# Patient Record
Sex: FEMALE | Race: WHITE
[De-identification: names, ages, dates, MRNs, and addresses within clinical notes are randomized per-mention and may not be internally consistent; named-entity substitution may affect disease eponyms.]

---

## 2017-09-20 ENCOUNTER — HOSPITAL ENCOUNTER (OUTPATIENT)
Dept: HOSPITAL 80 - FIMAGING | Age: 72
End: 2017-09-20
Attending: PHYSICIAN ASSISTANT
Payer: COMMERCIAL

## 2017-09-20 DIAGNOSIS — N28.1: ICD-10-CM

## 2017-09-20 DIAGNOSIS — N20.0: Primary | ICD-10-CM

## 2018-05-11 ENCOUNTER — HOSPITAL ENCOUNTER (OUTPATIENT)
Dept: HOSPITAL 80 - FIMAGING | Age: 73
End: 2018-05-11
Attending: FAMILY MEDICINE
Payer: COMMERCIAL

## 2018-05-11 DIAGNOSIS — Z12.31: Primary | ICD-10-CM

## 2018-05-25 ENCOUNTER — HOSPITAL ENCOUNTER (EMERGENCY)
Dept: HOSPITAL 80 - FED | Age: 73
Discharge: HOME | End: 2018-05-25
Payer: COMMERCIAL

## 2018-05-25 VITALS — DIASTOLIC BLOOD PRESSURE: 85 MMHG | SYSTOLIC BLOOD PRESSURE: 126 MMHG

## 2018-05-25 DIAGNOSIS — Z95.5: ICD-10-CM

## 2018-05-25 DIAGNOSIS — I25.2: ICD-10-CM

## 2018-05-25 DIAGNOSIS — R19.7: Primary | ICD-10-CM

## 2018-05-25 LAB — PLATELET # BLD: 257 10^3/UL (ref 150–400)

## 2018-05-25 NOTE — EDPHY
HPI/HX/ROS/PE/MDM


Narrative: 


CHIEF COMPLAINT: Nausea, diarrhea, indigestion, decreased appetite





HPI: 


The patient is a 74 y/o female with a history of an MI and multiple cardiac 

stents complaining of nausea, diarrhea, indigestion, and a decreased appetite 

for several weeks. She also has left back and rib pain. When she burps her pain 

goes away and her symptoms are alleviated. Denies chest pain, shortness of 

breath, numbness, paresthesias, fever, vomiting, urinary complaints. 





She was seen at [facility] when she had her MI and stents placed.





REVIEW OF SYSTEMS:


Aside from elements discussed in the HPI, a comprehensive 10-point review of 

systems was reviewed and is negative.





PMH: MI, cardiac stents





SOCIAL HISTORY: Lives in [place], retired, single





PHYSICAL EXAM:


General: Patient is alert, in no acute distress.


ENT: Eyes are normal to inspection.  ENT inspection normal.


Neck: Normal inspection.  Full range of motion.


Respiratory: No respiratory distress.  Breath sounds normal bilaterally.


Cardiovascular: Regular rate and rhythm.  Strong peripheral pulses.  Normal cap 

refill.


Abdomen: The abdomen is nontender to palpation. There are no peritoneal signs. 

There are normal bowel sounds.


Back: Normal to inspection.  No tenderness to palpation.


Skin: Normal color.  No rash.  Warm and dry.


Extremities: Normal appearance.  Full range of motion.


Neuro: Oriented x3.  Normal motor function.  Normal sensory function.





ED Course: 


1124: EKG was ordered and interpreted by myself.  Please see Figure 8 Surgical system 

for official reading. This EKG is unchanged from EKG at [facility] on 05/30/15.





1139: Patient's chest x-ray is normal.





1359: Repeat troponin and EKG ordered.





EKG was ordered and interpreted by myself and is unchanged from earlier EKG.  

Please see Figure 8 Surgical system for official reading.





1400: Reassessed patient and discussed imaging and laboratory findings. She 

denies any chest pain but states she had some diarrhea earlier. She is unable 

to provide a stool sample. 4mg PO Imodium given.





1439: Reassessed patient and discussed outpatient follow up with Dr. Gonzales, 

gastroenterologist. Return precautions provided; she is comfortable with this 

plan.





MDM: 





This patient presents with several weeks of diarrhea, associated with some 

nausea and upper chest pain which is relieved with burping.  All these symptoms 

suggest a GI etiology, but given history of MI, we performed an extensive 

workup including multiple ECG, troponins and other labs which are thankfully 

normal.  The patient has no active chest pain here. I see no evidence for ACS.  

Despite her complaint of diarrhea, she has been unable to provide a stool 

sample here.  She has no risk factors for C.diff.  She is comfortable with the 

plan to go home for now - I will refer her to GI for further workup.





- Data Points


Imaging Results: 


 Imaging Impressions





Chest X-Ray  05/25/18 11:39


Impression:


1. No active cardiopulmonary disease seen.











Imaging: I viewed and interpreted images myself


Laboratory Results: 


 Laboratory Results





 05/25/18 11:25 





 05/25/18 11:25 





 











  05/25/18 05/25/18 05/25/18





  14:22 11:25 11:25


 


WBC      9.40 10^3/uL 10^3/uL





     (3.80-9.50) 


 


RBC      4.32 10^6/uL 10^6/uL





     (4.18-5.33) 


 


Hgb      14.5 g/dL g/dL





     (12.6-16.3) 


 


Hct      41.3 % %





     (38.0-47.0) 


 


MCV      95.6 fL fL





     (81.5-99.8) 


 


MCH      33.6 pg pg





     (27.9-34.1) 


 


MCHC      35.1 g/dL g/dL





     (32.4-36.7) 


 


RDW      13.2 % %





     (11.5-15.2) 


 


Plt Count      257 10^3/uL 10^3/uL





     (150-400) 


 


MPV      10.5 fL fL





     (8.7-11.7) 


 


Neut % (Auto)      69.4 % %





     (39.3-74.2) 


 


Lymph % (Auto)      21.6 % %





     (15.0-45.0) 


 


Mono % (Auto)      7.1 % %





     (4.5-13.0) 


 


Eos % (Auto)      1.0 % %





     (0.6-7.6) 


 


Baso % (Auto)      0.5 % %





     (0.3-1.7) 


 


Nucleat RBC Rel Count      0.0 % %





     (0.0-0.2) 


 


Absolute Neuts (auto)      6.52 10^3/uL H 10^3/uL





     (1.70-6.50) 


 


Absolute Lymphs (auto)      2.03 10^3/uL 10^3/uL





     (1.00-3.00) 


 


Absolute Monos (auto)      0.67 10^3/uL 10^3/uL





     (0.30-0.80) 


 


Absolute Eos (auto)      0.09 10^3/uL 10^3/uL





     (0.03-0.40) 


 


Absolute Basos (auto)      0.05 10^3/uL 10^3/uL





     (0.02-0.10) 


 


Absolute Nucleated RBC      0.00 10^3/uL 10^3/uL





     (0-0.01) 


 


Immature Gran %      0.4 % %





     (0.0-1.1) 


 


Immature Gran #      0.04 10^3/uL 10^3/uL





     (0.00-0.10) 


 


Sodium    142 mEq/L mEq/L  





    (135-145)  


 


Potassium    4.4 mEq/L mEq/L  





    (3.3-5.0)  


 


Chloride    112 mEq/L H mEq/L  





    ()  


 


Carbon Dioxide    17 mEq/l L mEq/l  





    (22-31)  


 


Anion Gap    13 mEq/L mEq/L  





    (8-16)  


 


BUN    19 mg/dL mg/dL  





    (7-23)  


 


Creatinine    1.0 mg/dL mg/dL  





    (0.6-1.0)  


 


Estimated GFR    55   





    


 


Glucose    97 mg/dL mg/dL  





    ()  


 


Calcium    10.0 mg/dL mg/dL  





    (8.5-10.4)  


 


Troponin I  < 0.012 ng/mL ng/mL  < 0.012 ng/mL ng/mL  





   (0.000-0.034)   (0.000-0.034)  











Medications Given: 


 








Discontinued Medications





Loperamide HCl ( Imodium)  4 mg PO EDNOW ONE


   Stop: 05/25/18 14:05


   Last Admin: 05/25/18 14:54 Dose:  4 mg








General


Time Seen by Provider: 05/25/18 11:29


Initial Vital Signs: 


 Initial Vital Signs











Temperature (C)  36.7 C   05/25/18 10:32


 


Heart Rate  80   05/25/18 10:32


 


Respiratory Rate  18   05/25/18 10:32


 


Blood Pressure  160/97 H  05/25/18 10:32


 


O2 Sat (%)  97   05/25/18 10:32








 











O2 Delivery Mode               Room Air














Allergies/Adverse Reactions: 


 





amlodipine Allergy (Verified 05/25/18 10:29)


 


bupropion [From Contrave] Allergy (Verified 05/25/18 10:29)


 


clindamycin Allergy (Verified 05/25/18 10:29)


 


fenofibrate Allergy (Verified 05/25/18 10:28)


 


formaldehyde Allergy (Verified 05/25/18 10:28)


 


naltrexone [From Contrave] Allergy (Verified 05/25/18 10:29)


 


iv contrast Allergy (Uncoded 05/25/18 10:29)


 


unknown Allergy (Uncoded 05/25/18 10:28)


 








Home Medications: 














 Medication  Instructions  Recorded


 


Brilinta  05/25/18


 


Bumetanide  05/25/18


 


Carvedilol  05/25/18


 


Levothyroxine  05/25/18


 


Lisinopril  05/25/18


 


Ondansetron HCl [Zofran] 4 mg PO Q6 PRN #10 tablet 05/25/18


 


SIMVASTATIN  05/25/18


 


Spironolactone  05/25/18














Departure





- Departure


Disposition: Home, Routine, Self-Care


Clinical Impression: 


Diarrhea


Qualifiers:


 Diarrhea type: unspecified type Qualified Code(s): R19.7 - Diarrhea, 

unspecified





Condition: Good


Instructions:  Acute Diarrhea (ED)


Additional Instructions: 


Take Zofran as prescribed for nausea.





Follow-up with a gastroenterologist in the next week.





Follow-up with your primary doctor within 72 hours for unimproved symptoms if 

you are unable to see a gastroenterologist.  





Return to the Emergency Department for fever, worsening pain, urinary complaints

, inability to eat or failure to improve within 72 hours.





Referrals: 


Polina Gann MD [Primary Care Provider] - As per Instructions


Justin Gonzales MD [Medical Doctor] - As per Instructions


Prescriptions: 


Ondansetron HCl [Zofran] 4 mg PO Q6 PRN #10 tablet


 PRN Reason: Nausea/Vomiting, Use 1st


Report Scribed for: Tylor Good


Report Scribed by: Cindi Charlton


Date of Report: 05/25/18


Time of Report: 11:34


Physician Review and Approval Statement: Portions of this note were transcribed 

by an ED scribe.  I personally performed the history, physical exam, and 

medical decision making; and confirm the accuracy of the information in the 

transcribed note.

## 2018-05-25 NOTE — CPEKG
Heart Rate: 63

RR Interval: 952

P-R Interval: 136

QRSD Interval: 104

QT Interval: 448

QTC Interval: 459

P Axis: 21

QRS Axis: -5

T Wave Axis: 265

EKG Severity - ABNORMAL ECG -

EKG Impression: SINUS RHYTHM

EKG Impression: INFERIOR INFARCT, AGE INDETERMINATE

EKG Impression: BORDERLINE R WAVE PROGRESSION, ANTERIOR LEADS

EKG Impression: NONSPECIFIC T ABNORMALITIES, ANT-LAT LEADS

Electronically Signed By: Reji Kan 30-May-2018 11:46:10

## 2018-05-25 NOTE — CPEKG
Heart Rate: 68

RR Interval: 882

P-R Interval: 148

QRSD Interval: 108

QT Interval: 436

QTC Interval: 464

P Axis: 18

QRS Axis: 3

T Wave Axis: -89

EKG Severity - ABNORMAL ECG -

EKG Impression: SINUS RHYTHM

EKG Impression: PROBABLE INFERIOR INFARCT, AGE INDETERMINATE

EKG Impression: ABNORMAL T, CONSIDER ISCHEMIA, ANT-LAT LEADS

Electronically Signed By: Tylor Good 25-May-2018 11:50:39

## 2018-11-16 ENCOUNTER — HOSPITAL ENCOUNTER (INPATIENT)
Dept: HOSPITAL 80 - FED | Age: 73
LOS: 5 days | Discharge: HOME | DRG: 227 | End: 2018-11-21
Attending: FAMILY MEDICINE | Admitting: INTERNAL MEDICINE
Payer: COMMERCIAL

## 2018-11-16 DIAGNOSIS — Z87.891: ICD-10-CM

## 2018-11-16 DIAGNOSIS — N17.9: ICD-10-CM

## 2018-11-16 DIAGNOSIS — E03.9: ICD-10-CM

## 2018-11-16 DIAGNOSIS — Z79.82: ICD-10-CM

## 2018-11-16 DIAGNOSIS — I25.2: ICD-10-CM

## 2018-11-16 DIAGNOSIS — Z95.5: ICD-10-CM

## 2018-11-16 DIAGNOSIS — E78.5: ICD-10-CM

## 2018-11-16 DIAGNOSIS — E86.0: ICD-10-CM

## 2018-11-16 DIAGNOSIS — I25.10: ICD-10-CM

## 2018-11-16 DIAGNOSIS — R19.7: ICD-10-CM

## 2018-11-16 DIAGNOSIS — I48.0: Primary | ICD-10-CM

## 2018-11-16 DIAGNOSIS — I10: ICD-10-CM

## 2018-11-16 DIAGNOSIS — I25.5: ICD-10-CM

## 2018-11-16 DIAGNOSIS — I73.9: ICD-10-CM

## 2018-11-16 DIAGNOSIS — K21.9: ICD-10-CM

## 2018-11-16 LAB
INR PPP: 0.97 (ref 0.83–1.16)
PLATELET # BLD: 345 10^3/UL (ref 150–400)
PROTHROMBIN TIME: 13.1 SEC (ref 12–15)

## 2018-11-16 PROCEDURE — C1721 AICD, DUAL CHAMBER: HCPCS

## 2018-11-16 PROCEDURE — C1898 LEAD, PMKR, OTHER THAN TRANS: HCPCS

## 2018-11-16 PROCEDURE — C1777 LEAD, AICD, ENDO SINGLE COIL: HCPCS

## 2018-11-16 RX ADMIN — TICAGRELOR SCH MG: 90 TABLET ORAL at 21:14

## 2018-11-16 RX ADMIN — ATORVASTATIN CALCIUM SCH: 20 TABLET, FILM COATED ORAL at 21:29

## 2018-11-16 RX ADMIN — PANTOPRAZOLE SODIUM SCH MG: 40 INJECTION, POWDER, FOR SOLUTION INTRAVENOUS at 13:36

## 2018-11-16 RX ADMIN — ENOXAPARIN SODIUM SCH MG: 100 INJECTION SUBCUTANEOUS at 21:14

## 2018-11-16 NOTE — CPEKG
Test Reason : OPEN

Blood Pressure : ***/*** mmHG

Vent. Rate : 149 BPM     Atrial Rate : 333 BPM

   P-R Int : 392 ms          QRS Dur : 114 ms

    QT Int : 305 ms       P-R-T Axes : 000 001 252 degrees

   QTc Int : 480 ms

 

Atrial fibrillation with rapid V-rate

Inferior infarct, recent

Lateral leads are also involved

 

Confirmed by Martin Awan ([id number]) on 11/16/2018 3:30:27 PM

 

Referred By:             Confirmed By:Martin Awan

## 2018-11-16 NOTE — EDPHY
H & P


Time Seen by Provider: 11/16/18 09:46


HPI/ROS: 





Chief complaint.  Nausea, vomiting, diarrhea





HPI.  73-year-old female presents with complaint of vomiting and diarrhea for 1 

month.  No fever.  Some indigestion mid abdomen but otherwise no abdominal 

pain.  Occasional chest discomfort.  She feels palpitations.  Previous atrial 

fibrillation and previous MI.  Decreased oral intake secondary to nausea and 

vomiting.  





ROS


10 systems were reviewed and negative with the exception of the elements 

mentioned in the history of present illness





Past Medical/Surgical History: 





Previous MI and hypertension


Social History: 





Single, nonsmoker, no alcohol.  Lives alone


Smoking Status: Never smoked


Physical Exam: 





General Appearance:  Alert well-developed female moderate distress vital signs 

significant for heart rate 112


Eyes: Pupils equal and round no pallor or injection.


ENT, Mouth:  Mucous membranes are moist.


Respiratory:  There are no retractions, lungs are clear to auscultation.


Cardiovascular:  Irregularly irregular rate and rhythm


Gastrointestinal:   Abdomen is soft and nontender, no masses, bowel sounds 

normal.


Neurological:  Awake and alert, sensory and motor exams grossly normal.


Skin: Warm and dry, no rashes.


Musculoskeletal:  Neck is supple nontender.


Extremities  symmetrical, full range of motion.


Psychiatric: Patient is oriented X 3, there is no agitation.


Constitutional: 


 Initial Vital Signs











Temperature (C)  36.7 C   11/16/18 09:16


 


Heart Rate  112 H  11/16/18 09:16


 


Respiratory Rate  17   11/16/18 09:16


 


Blood Pressure  132/84 H  11/16/18 09:16


 


O2 Sat (%)  96   11/16/18 09:16








 











O2 Delivery Mode               Room Air














Allergies/Adverse Reactions: 


 





amlodipine Allergy (Verified 11/16/18 11:36)


 Vomiting


clindamycin Allergy (Verified 11/16/18 11:36)


 Rash


fenofibrate Allergy (Verified 11/16/18 11:36)


 Vomiting


formaldehyde Allergy (Verified 11/16/18 11:36)


 Vomiting


iv contrast Allergy (Uncoded 05/25/18 10:29)


 








Home Medications: 














 Medication  Instructions  Recorded


 


Carvedilol [Coreg (*)] 6.25 mg PO BID 05/25/18


 


Simvastatin [Zocor] 40 mg PO HS 05/25/18


 


Ticagrelor [Brilinta] 90 mg PO BID 05/25/18


 


Aspirin [Aspirin 81mg (*)] 81 mg PO DAILY 11/16/18


 


Levothyroxine [Synthroid 88 mcg 88 mcg PO DAILY06 11/16/18





(*)]  


 


Spironolactone [Aldactone 25 MG 25 mg PO BID 11/16/18





(*)]  














Medical Decision Making





- Diagnostics


EKG Interpretation: 





EKG interpreted by me shows atrial fibrillation with rapid ventricular rate.  

Left axis deviation.  QRS shows no significant ST elevation or depression.  

Ventricular response is 149


Imaging Results: 


 Imaging Impressions





Chest X-Ray  11/16/18 09:47


Impression: Query low-grade congestive heart failure without pulmonary edema.








Chest x-ray interpreted by me shows pneumonia.  Possible CHF


Procedures: 





IV normal saline, monitor





After reviewing patient's allergies and the possible cross reaction of 

amlodipine to diltiazem.  Patient tells me that she has not had amlodipine for 

a long time but it made her sick.  It does not sound like there was an allergic 

reaction





IV diltiazem bolus and drip


ED Course/Re-evaluation: 





Re-evaluation at 10:45 a.m. A.m..  Heart rate 102. Still in AFib.  Diltiazem 

drip running at 5 milligrams/hour.  Blood pressure 129/74





Patient and I discussed imaging lab results.  We discussed treatment plan 

including recommendation for admission.  She expresses understanding and 

agreement





I consulted discussed case Dr. Hilton molina who agrees to the admission


Differential Diagnosis: 





I considered electrolyte and dehydration as potential cause of atrial 

fibrillation.  I have considered acute coronary syndrome.  Patient has atrial 

fibrillation with RVR





- Data Points


Laboratory Results: 


 Laboratory Results





 11/16/18 09:40 





 11/16/18 09:40 





 











  11/16/18 11/16/18 11/16/18





  09:47 09:40 09:40


 


WBC      





    


 


RBC      





    


 


Hgb      





    


 


Hct      





    


 


MCV      





    


 


MCH      





    


 


MCHC      





    


 


RDW      





    


 


Plt Count      





    


 


MPV      





    


 


Neut % (Auto)      





    


 


Lymph % (Auto)      





    


 


Mono % (Auto)      





    


 


Eos % (Auto)      





    


 


Baso % (Auto)      





    


 


Nucleat RBC Rel Count      





    


 


Absolute Neuts (auto)      





    


 


Absolute Lymphs (auto)      





    


 


Absolute Monos (auto)      





    


 


Absolute Eos (auto)      





    


 


Absolute Basos (auto)      





    


 


Absolute Nucleated RBC      





    


 


Immature Gran %      





    


 


Immature Gran #      





    


 


PT      





    


 


INR      





    


 


APTT      





    


 


Sodium      134 mEq/L L mEq/L





     (135-145) 


 


Potassium      4.4 mEq/L mEq/L





     (3.3-5.0) 


 


Chloride      105 mEq/L mEq/L





     () 


 


Carbon Dioxide      15 mEq/l L mEq/l





     (22-31) 


 


Anion Gap      14 mEq/L mEq/L





     (6-14) 


 


BUN      53 mg/dL H mg/dL





     (7-23) 


 


Creatinine      1.4 mg/dL H mg/dL





     (0.6-1.0) 


 


Estimated GFR      37 





    


 


Glucose      128 mg/dL H mg/dL





     () 


 


Calcium      10.0 mg/dL mg/dL





     (8.5-10.4) 


 


Magnesium    Pending   





    


 


POC Troponin I  0.00 ng/mL ng/mL    





   (0.00-0.08)   


 


Troponin I    < 0.012 ng/mL ng/mL  





    (0.000-0.034)  


 


TSH    6.680 uIU/mL H uIU/mL  





    (0.465-4.680)  














  11/16/18 11/16/18





  09:40 09:40


 


WBC    13.94 10^3/uL H 10^3/uL





    (3.80-9.50) 


 


RBC    4.78 10^6/uL 10^6/uL





    (4.18-5.33) 


 


Hgb    15.6 g/dL g/dL





    (12.6-16.3) 


 


Hct    43.4 % %





    (38.0-47.0) 


 


MCV    90.8 fL fL





    (81.5-99.8) 


 


MCH    32.6 pg pg





    (27.9-34.1) 


 


MCHC    35.9 g/dL g/dL





    (32.4-36.7) 


 


RDW    13.0 % %





    (11.5-15.2) 


 


Plt Count    345 10^3/uL 10^3/uL





    (150-400) 


 


MPV    10.6 fL fL





    (8.7-11.7) 


 


Neut % (Auto)    71.6 % %





    (39.3-74.2) 


 


Lymph % (Auto)    20.4 % %





    (15.0-45.0) 


 


Mono % (Auto)    7.2 % %





    (4.5-13.0) 


 


Eos % (Auto)    0.2 % L %





    (0.6-7.6) 


 


Baso % (Auto)    0.2 % L %





    (0.3-1.7) 


 


Nucleat RBC Rel Count    0.0 % %





    (0.0-0.2) 


 


Absolute Neuts (auto)    9.99 10^3/uL H 10^3/uL





    (1.70-6.50) 


 


Absolute Lymphs (auto)    2.84 10^3/uL 10^3/uL





    (1.00-3.00) 


 


Absolute Monos (auto)    1.00 10^3/uL H 10^3/uL





    (0.30-0.80) 


 


Absolute Eos (auto)    0.03 10^3/uL 10^3/uL





    (0.03-0.40) 


 


Absolute Basos (auto)    0.03 10^3/uL 10^3/uL





    (0.02-0.10) 


 


Absolute Nucleated RBC    0.00 10^3/uL 10^3/uL





    (0-0.01) 


 


Immature Gran %    0.4 % %





    (0.0-1.1) 


 


Immature Gran #    0.05 10^3/uL 10^3/uL





    (0.00-0.10) 


 


PT  13.1 SEC SEC  





   (12.0-15.0)  


 


INR  0.97   





   (0.83-1.16)  


 


APTT  29.6 SEC SEC  





   (23.0-38.0)  


 


Sodium    





   


 


Potassium    





   


 


Chloride    





   


 


Carbon Dioxide    





   


 


Anion Gap    





   


 


BUN    





   


 


Creatinine    





   


 


Estimated GFR    





   


 


Glucose    





   


 


Calcium    





   


 


Magnesium    





   


 


POC Troponin I    





   


 


Troponin I    





   


 


TSH    





   











Medications Given: 


 





Ondansetron HCl (Zofran)  4 mg IVP Q4HRS PRN


   PRN Reason: Nausea/Vomiting, Can't Take PO


   Stop: 05/15/19 12:26


   Last Admin: 11/16/18 14:45 Dose:  4 mg


Pantoprazole Sodium (Protonix)  40 mg IVP DAILY GILA


   Stop: 05/15/19 13:29


   Last Admin: 11/16/18 13:36 Dose:  40 mg





Discontinued Medications





Diltiazem HCl (Cardizem 25 Mg/5 Ml Vial)  10 mg IVP EDNOW ONE


   Stop: 11/16/18 09:58


   Last Admin: 11/16/18 10:13 Dose:  10 mg


Enoxaparin Sodium (Lovenox)  50 mg SC EDNOW ONE


   Stop: 11/16/18 10:52


   Last Admin: 11/16/18 11:40 Dose:  50 mg


Sodium Chloride (Ns)  500 mls @ 0 mls/hr IV EDNOW ONE; Wide Open


   PRN Reason: Protocol


   Stop: 11/16/18 09:47


   Last Admin: 11/16/18 09:49 Dose:  500 mls


Diltiazem HCl 125 mg/ Dextrose  125 mls @ 0 mls/hr IV EDNOW ONE; As Directed


   PRN Reason: Protocol


   Stop: 11/16/18 09:58


   Last Admin: 11/16/18 10:26 Dose:  125 mls


Ondansetron HCl (Zofran)  4 mg IVP EDNOW ONE


   Stop: 11/16/18 09:52


   Last Admin: 11/16/18 09:53 Dose:  4 mg





Point of Care Test Results: 


 Chemistry











  11/16/18





  09:47


 


POC Troponin I  0.00 ng/mL ng/mL





   (0.00-0.08) 














Departure





- Departure


Disposition: [facility] Inpatient Acute


Clinical Impression: 


Atrial fibrillation


Qualifiers:


 Atrial fibrillation type: unspecified Qualified Code(s): I48.91 - Unspecified 

atrial fibrillation





Condition: Fair

## 2018-11-16 NOTE — PDMN
Medical Necessity


Medical necessity: Pt meets inpt criteria per MD order and MCG M-505, Atrial 

Fibrillation. 74 y/o w/hx off CAD/inferior MI s/p multivessel PCI complicated 

by severe ischemic cardiomyopathy (LVEF 18% on recent ECHO) admitted with Afib w

/RVR (HR in 140's upon admission), dehydration, HERB, and dyspepsia. Diltiazem 

gtt, IV PPI, cardiology consult pending. Other comorbidities include paroxysmal 

afib not on anticoagulation, PVD s/p revascularizations. Anticipate>2MN for 

ongoing eval/management of above issues in pt w/complex cardiovascular hx.

## 2018-11-16 NOTE — GHP
DATE OF ADMISSION:  11/16/2018



CHIEF COMPLAINT:  Nausea, vomiting, and feeling unwell.



HISTORY OF PRESENT ILLNESS:  This 73-year-old female has a history of complex cardiac nature of infer
ior myocardial infarction with multivessel PCI with subsequent ischemic cardiomyopathies performed at
 an outside hospital.  She had subsequently transferred care to Dr. Gann at our facility a few months
 ago.  Her last echocardiogram a few months ago showed an LVEF of 18%, which apparently is stable.  S
he has a history of paroxysmal atrial fibrillation as well.  She has refused both defibrillator place
ment and anticoagulation in the past.  She has severe peripheral vascular disease with endografts in 
her aorta, her renal arteries, her iliac arteries.  She has a history of distally occluded aorta from
 Leriche's syndrome as well.  She apparently was doing well until a few months ago when she had losar
tan given to her.  This caused significant nausea, vomiting.  She actually stopped that drug 4 months
 ago and was feeling well for a while, had been placed on lisinopril but recently has been noticing o
nce again indigestion with GERD and diarrhea.  She feels this is similar to the diarrhea she initiall
y had with losartan.  Now she has not been taking that drug.  Today she felt her heart was racing wit
hout syncope.  She came to the emergency room where she was noted to be in atrial fibrillation with r
apid ventricular response, which was well controlled with IV diltiazem.  She is receiving IV hydratio
n as well.  She has some prerenal azotemia from her labs.  She has not had any angina or other issues
.  She denies any fever, chills, nausea, vomiting, GI/ blood loss.  She is seen resting in her bed.
  She seems to be fairly comfortable at this time.



ALLERGIES:  She has allergies to amlodipine, clonidine, fenofibrate, formaldehyde, and IV contrast.



OUTPATIENT MEDICATIONS:  Include carvedilol, simvastatin, Brilinta, aspirin, levothyroxine, spironola
ctone, and she was taking lisinopril up until recently.



PAST MEDICAL HISTORY:  Once again, coronary artery disease with ischemic cardiomyopathy, paroxysmal a
trial fibrillation, peripheral vascular disease, hypertension.



SURGICAL HISTORY:  See HPI.



SOCIAL HISTORY:  She has a history of former smoker.  She does not smoke now.  She has no alcohol or 
drug abuse.



REVIEW OF SYSTEMS:  10-point system is negative except for the HPI.



PHYSICAL EXAMINATION:  VITAL SIGNS:  Blood pressure is 124/75, heart rate in the 90s, atrial fibrilla
tion.  GENERAL:  She is a thin female who is alert and oriented x3.  HEENT:  Mouth, oropharynx appear
 to be dry.  LUNGS:  Clear to auscultation.  CARDIOVASCULAR:  She had a S1, S2 with a soft systolic m
urmur.  ABDOMEN:  Soft, nontender.  She had no edema.  She had decreased pedal pulses.  She had a rad
ial pulse on the left side which was stronger __________ right side.



LABS:  Potassium of 4.4, BUN of 54, creatinine 1.4, troponin 6.6, troponin, it was normal.  BNP is pe
nding.  White count of 13, hemoglobin of 15.



ASSESSMENT:  

1.  Atrial fibrillation, unknown chronicity.  Patient has a history of paroxysmal atrial fibrillation
.  Only EKG we have in our office was from 09/17 that showed normal sinus rhythm with inferior myocar
dial infarction and premature atrial contractions.  Patient denies any syncope or other issues.  This
 atrial fibrillation is of unknown chronicity.  At this point, she once again does not want anticoagu
lation.  She has agreed to Lovenox while she is in the hospital.  Her rate is under good control with
 intravenous diltiazem and intravenous hydration.  We will continue these current medications for now
.  We will switch over to by mouth medications if needed once her gastrointestinal status is under be
tter control as she still is complaining of significant nausea and diarrhea.  

2.  Nausea and diarrhea, unclear etiology.  Apparently chronic.  Some related to losartan, but this i
s a medication that was given many months ago.  She is not having any other fevers or other issues at
 this time.  This will be worked up by the hospitalist service.  

3.  History of peripheral vascular disease.  Patient on aspirin, Brilinta.  

4.  Patient on a statin therapy as well.  For now, continue current medical management.  We will foll
ow and further decisions depending on her clinical course.  At this time, she does not want automatic
 implanted cardioverter defibrillator placement or anticoagulation long-term.





Job #:  [id number]

## 2018-11-16 NOTE — ASMTCMCOM
CM Note

 

CM Note                       

Notes:

Pt has been admitted with n/v/d x 1 month. She has a hx of MI, afib, CAD. Cardiology following. CM 

will follow for any d/c needs.

 

Date Signed:  11/16/2018 04:33 PM

Electronically Signed By:DOMINGUEZ Wang

## 2018-11-16 NOTE — PDGENHP
History and Physical





- Chief Complaint


"I feel awful"





- History of Present Illness


72yo F with history of CAD/inferior MI s/p multivessel PCI complicated by 

severe ischemic cardiomyopathy (LVEF 18% on recent echo), paroxysmal atrial 

fibrillation not on anticoagulation, peripheral vascular disease s/p several 

revascularizations presents with numerous complaints. She reports about 6 

months of diarrhea in addition to recently having quite a bit of indigestion 

associated with non-bloody emesis. She initially attributed her diarrhea to 

losartan, which was started just before her diarrhea began. She self-

discontinued losartan about 1 month ago and her diarrhea abated somewhat until 

about 10 days ago. She reports at that time that her synthroid dose had been 

increased from 88 to 100mcg daily and she attributes her more recent diarrhea 

to this dose increase. She states that she feels terrible. Denies fevers, chills

, recent travel, history of C diff colitis, sick contacts. 





In the ED, she was noted to be in atrial fibrillation with ventricular rates in 

the 140s. She was started on a diltiazem drip with good control. She denies 

chest pain, palpitations, shortness of breath, presyncope/syncope, leg swelling

, abdominal distention, or orthopnea. 





History Information





- Allergies/Home Medication List


Allergies/Adverse Reactions: 








amlodipine Allergy (Verified 11/16/18 11:36)


 Vomiting


clindamycin Allergy (Verified 11/16/18 11:36)


 Rash


fenofibrate Allergy (Verified 11/16/18 11:36)


 Vomiting


formaldehyde Allergy (Verified 11/16/18 11:36)


 Vomiting


iv contrast Allergy (Uncoded 05/25/18 10:29)


 





Home Medications: 








Carvedilol [Coreg (*)] 6.25 mg PO BID 05/25/18 [Last Taken 11/15/18 21:00]


Simvastatin [Zocor] 40 mg PO HS 05/25/18 [Last Taken 11/15/18]


Ticagrelor [Brilinta] 90 mg PO BID 05/25/18 [Last Taken 11/16/18]


Aspirin [Aspirin 81mg (*)] 81 mg PO DAILY 11/16/18 [Last Taken 11/13/18]


Levothyroxine [Synthroid 88 mcg (*)] 88 mcg PO DAILY06 11/16/18 [Last Taken 11/ 13/18]


Spironolactone [Aldactone 25 MG (*)] 25 mg PO BID 11/16/18 [Last Taken 11/13/18]





I have personally reviewed and updated: family history, medical history, social 

history, surgical history





- Past Medical History


Additional medical history: CAD/interior MI s/p multivessel PCI years ago, 

HFrEF 2/2 ICM (LVEF 18%), PVD, paroxysmal atrial fibrillation, HTN





- Surgical History


Additional surgical history: coronary stenting, bilateral iliac stenting, renal 

artery stenting, breast augmentation surgery, adenoidectomy, tonsillectomy





- Social History


Smoking Status: Former smoker


Alcohol Use: None


Drug Use: None


Additional social history: Lives alone, 2 children





Review of Systems


Review of Systems: 





ROS: 10pt was reviewed & negative except for what was stated in HPI & below





Physical Exam


Physical Exam: 

















Temp Pulse Resp BP Pulse Ox


 


 36.6 C   93   18   124/75 H  98 


 


 11/16/18 12:12  11/16/18 12:12  11/16/18 12:12  11/16/18 12:12  11/16/18 12:12











Constitutional: no apparent distress


Eyes: PERRL, anicteric sclera, EOMI


Ears, Nose, Mouth, Throat: dry mucous membranes


Cardiovascular: irregularly irregular, No systolic murmur, No JVD, No edema


Respiratory: no respiratory distress, no rales or rhonchi, No reduced air 

movement


Gastrointestinal: normoactive bowel sounds, no palpable masses, tenderness (

epigastric area)


Genitourinary: no bladder fullness, no bladder tenderness


Skin: warm, normal color, no rashes or abrasions, no fluctuance, no induration, 

No mottled


Musculoskeletal: full muscle strength, no muscle tenderness, normal joint ROM, 

no joint effusions


Neurologic: AAOx3


Psychiatric: interacting appropriately, not anxious, not encephalopathic, 

thought process linear





Lab Data & Imaging Review





 11/16/18 09:40





 11/16/18 09:40














WBC  13.94 10^3/uL (3.80-9.50)  H  11/16/18  09:40    


 


RBC  4.78 10^6/uL (4.18-5.33)   11/16/18  09:40    


 


Hgb  15.6 g/dL (12.6-16.3)   11/16/18  09:40    


 


Hct  43.4 % (38.0-47.0)   11/16/18  09:40    


 


MCV  90.8 fL (81.5-99.8)   11/16/18  09:40    


 


MCH  32.6 pg (27.9-34.1)   11/16/18  09:40    


 


MCHC  35.9 g/dL (32.4-36.7)   11/16/18  09:40    


 


RDW  13.0 % (11.5-15.2)   11/16/18  09:40    


 


Plt Count  345 10^3/uL (150-400)   11/16/18  09:40    


 


MPV  10.6 fL (8.7-11.7)   11/16/18  09:40    


 


Neut % (Auto)  71.6 % (39.3-74.2)   11/16/18  09:40    


 


Lymph % (Auto)  20.4 % (15.0-45.0)   11/16/18  09:40    


 


Mono % (Auto)  7.2 % (4.5-13.0)   11/16/18  09:40    


 


Eos % (Auto)  0.2 % (0.6-7.6)  L  11/16/18  09:40    


 


Baso % (Auto)  0.2 % (0.3-1.7)  L  11/16/18  09:40    


 


Nucleat RBC Rel Count  0.0 % (0.0-0.2)   11/16/18  09:40    


 


Absolute Neuts (auto)  9.99 10^3/uL (1.70-6.50)  H  11/16/18  09:40    


 


Absolute Lymphs (auto)  2.84 10^3/uL (1.00-3.00)   11/16/18  09:40    


 


Absolute Monos (auto)  1.00 10^3/uL (0.30-0.80)  H  11/16/18  09:40    


 


Absolute Eos (auto)  0.03 10^3/uL (0.03-0.40)   11/16/18  09:40    


 


Absolute Basos (auto)  0.03 10^3/uL (0.02-0.10)   11/16/18  09:40    


 


Absolute Nucleated RBC  0.00 10^3/uL (0-0.01)   11/16/18  09:40    


 


Immature Gran %  0.4 % (0.0-1.1)   11/16/18  09:40    


 


Immature Gran #  0.05 10^3/uL (0.00-0.10)   11/16/18  09:40    


 


PT  13.1 SEC (12.0-15.0)   11/16/18  09:40    


 


INR  0.97  (0.83-1.16)   11/16/18  09:40    


 


APTT  29.6 SEC (23.0-38.0)   11/16/18  09:40    


 


Sodium  134 mEq/L (135-145)  L  11/16/18  09:40    


 


Potassium  4.4 mEq/L (3.3-5.0)   11/16/18  09:40    


 


Chloride  105 mEq/L ()   11/16/18  09:40    


 


Carbon Dioxide  15 mEq/l (22-31)  L  11/16/18  09:40    


 


Anion Gap  14 mEq/L (6-14)   11/16/18  09:40    


 


BUN  53 mg/dL (7-23)  H  11/16/18  09:40    


 


Creatinine  1.4 mg/dL (0.6-1.0)  H  11/16/18  09:40    


 


Estimated GFR  37   11/16/18  09:40    


 


Glucose  128 mg/dL ()  H  11/16/18  09:40    


 


Calcium  10.0 mg/dL (8.5-10.4)   11/16/18  09:40    


 


POC Troponin I  0.00 ng/mL (0.00-0.08)   11/16/18  09:47    








Visualized and Interpreted Chest x-ray results: Yes


Visualized and Interpreted imaging results: Yes


Interpretation: CXR: hyperinflated lungs, no infiltrate, tiny right pleural 

effusion, borderline increase in pulmonary vascularity, bilateral breast 

implants (interpreted by me)


Visualized and Interpreted EKG results: Yes


EKG additional interpertation: ECG: atrial fibrillation with ventricular rate 

140s, inferior q waves





Assessment & Plan


Assessment: 


72yo F with history of CAD/inferior MI s/p multivessel PCI complicated by 

severe ischemic cardiomyopathy (LVEF 18% on recent echo), paroxysmal atrial 

fibrillation not on anticoagulation, peripheral vascular disease s/p several 

revascularizations presents with subacute diarrhea and epigastric discomfort 

found to be in atrial fibrillation with RVR.


Plan: 





1. Atrial fibrillation with RVR: Suspect driven by dehydration. Hemodynamically 

stable and asymptomatic. Rxcld1wfci score of 5. She has not been on 

anticoagulation in the past for this as she prefers anti-platelet therapy for 

her coronary and peripheral vascular disease. 


   - Check BNP, troponin, TSH


   - Continue diltiazem gtt


   - Enoxaparin 1mg/kg. Patient wants to think about long-term anticoagulation. 

Tricky situation as high risk for bleeding with triple therapy.


   - s/p 500mL, will consider additional IVF cautiously based on BNP


   - Discuss with cardiology. Hold on repeat echo as had one roughly 6 weeks ago





2. Acute kidney injury: Likely prerenal from lack of atrial kick and 

dehydration.


   - IVF as above, recheck in AM





3. Diarrhea: Somewhat chronic. No h/o C diff. No recent abx, travel. 


   - Send GI PCR. If negative, consider w/u for malabsorption





4. Dyspepsia: Seems consistent with uncontrolled acid reflux.


   - Start IV PPI





5. HFrEF 2/2 ICM: LVEF 18%. Does not appear volume overloaded, dry if anything. 

She is not on goal directed therapy except for a beta blocker. She has self-

discontinued her ARB and MRA due to intolerance. 


   - Discus GDMT with patient. She has refused AICD in the past.





6. CAD: Denies anginal sxs. Remote multivessel PCI.


   - Continue aspirin, ticagrelor and statin





7. Peripheral vascular disease: Home meds.





VTE ppx: systemic anticoagulation


Code: full


Dispo: Admit as inpatient for management of atrial fibrillation.

## 2018-11-17 LAB — PLATELET # BLD: 263 10^3/UL (ref 150–400)

## 2018-11-17 RX ADMIN — CARVEDILOL SCH MG: 3.12 TABLET, FILM COATED ORAL at 11:47

## 2018-11-17 RX ADMIN — ONDANSETRON PRN MG: 4 TABLET, ORALLY DISINTEGRATING ORAL at 06:27

## 2018-11-17 RX ADMIN — TICAGRELOR SCH MG: 90 TABLET ORAL at 09:24

## 2018-11-17 RX ADMIN — ATORVASTATIN CALCIUM SCH: 20 TABLET, FILM COATED ORAL at 20:55

## 2018-11-17 RX ADMIN — ENOXAPARIN SODIUM SCH MG: 100 INJECTION SUBCUTANEOUS at 10:35

## 2018-11-17 RX ADMIN — ONDANSETRON PRN MG: 4 TABLET, ORALLY DISINTEGRATING ORAL at 12:54

## 2018-11-17 RX ADMIN — PANTOPRAZOLE SODIUM SCH MG: 40 INJECTION, POWDER, FOR SOLUTION INTRAVENOUS at 09:25

## 2018-11-17 RX ADMIN — LEVOTHYROXINE SODIUM SCH MCG: 0.09 TABLET ORAL at 06:27

## 2018-11-17 RX ADMIN — CARVEDILOL SCH MG: 3.12 TABLET, FILM COATED ORAL at 20:50

## 2018-11-17 RX ADMIN — ENOXAPARIN SODIUM SCH MG: 100 INJECTION SUBCUTANEOUS at 20:52

## 2018-11-17 NOTE — SOAPPROG
SOAP Progress Note


Assessment/Plan: 


Assessment:1. ISCM...eg in the 20% range.bun/creat improved...b/p stable...pt 

has agreed to aicd placement while she is in the hospital...will try to start 

coreg if her gi track allows


2. pvd..multiple pvd stentings ..no symptoms now


3. chronic n/w/diarrhea..per hops service


5. afib...back in nsr...will rediscuss anticoagulation options after 

aicd...lovenox for now..pt has refused anticoagulation in the past

















Plan:1. as ordered





11/17/18 10:33





Subjective: 





pt feeling better today ...back in nsr..pty with one 12 beat run of ns-

vt....apoorvahas agreed to proceed with aicd at this time...thishas been recommeded 

for quite some time..she will stay in hospital for this ..gi w/u in progress


Objective: 





 Vital Signs











Temp Pulse Resp BP Pulse Ox


 


 98.1 C H  68   16   128/66 H  100 


 


 11/17/18 08:10  11/17/18 08:10  11/17/18 08:10  11/17/18 08:10  11/17/18 08:10








 Microbiology











 11/16/18 18:17 Gastrointestinal Tract Panel (PCR) - Final





 Stool    No Organism Detected By Pcr








 Laboratory Results





 11/17/18 03:20 





 11/17/18 03:20 





 











 11/16/18 11/17/18 11/18/18





 05:59 05:59 05:59


 


Intake Total  600 360


 


Output Total  900 


 


Balance  -300 360








 











PT  13.1 SEC (12.0-15.0)   11/16/18  09:40    


 


INR  0.97  (0.83-1.16)   11/16/18  09:40    














Physical Exam





- Physical Exam


Respiratory: lungs clear


Cardiac/Chest: regular rate, rhythm, No edema, No JVD





ICD10 Worksheet


Patient Problems: 


 Problems











Problem Status Onset


 


Atrial fibrillation Acute

## 2018-11-17 NOTE — HOSPPROG
Hospitalist Progress Note


Assessment/Plan: 





72yo F with history of CAD/inferior MI s/p multivessel PCI complicated by 

severe ischemic cardiomyopathy (LVEF 18% on recent echo), paroxysmal atrial 

fibrillation not on anticoagulation, peripheral vascular disease s/p several 

revascularizations presents with subacute diarrhea and epigastric discomfort 

found to be in atrial fibrillation with RVR.





# Atrial fibrillation with RVR:  Off Dilt, now in NSR.  Mnxey0oaym score of 5. 


   - Cont Lovenox, discussed changing to Coumadin after AICD placement, pt 

agrees (could drop one of her anti-platelets)


   - Add Coreg





# CAD / ICM: recent echo showed EF 18%, remote multivessel PCI.


   - start BB as above


   - AICD planned for Monday (hold lovenox Sun pm, NPO after midnight)





# NSVT: 15 beats this am, symptomatic


   - discussed with cards, started coreg


   - pt now agreeable to AICD, planned for Monday morning, hold anti-platelets, 

cont lovenox until then





# Acute kidney injury: Likely prerenal, Cr 1.4 --> 1.1





# Diarrhea: Somewhat chronic. No h/o C diff. No recent abx, travel. Last c-

scope 5 yrs ago, she notes "pre-cancerous" findings


   - GI PCR neg


   - send fecal fat, fecal leuks, celiac labs


   - likely needs repeat C-scope, but prioritize need for AICD and 

stabilization of CV status to minimize anesthesia risk


   - consider inpt c-scope once above issues stable, prior to initiation of 

coumadin





# Dyspepsia: Seems consistent with uncontrolled acid reflux.


   - change to po ppi





# Peripheral vascular disease: Holding ASA, Brilinta for procedure Monday


   - consider resuming just single anti-platelet therapy with addition of 

anticoagulation





# Hypothyroidism - THS slightly elevated, though pt recently decreased 

levothyroxine dose due to concern this was causing diarrhea 


   - outpt f/u to determine if her dose should be increased, defer doing so now 

with rapid A fib and NSVT





VTE ppx: systemic anticoagulation


Code: full


Dispo: Cont inpt for ongoing management of a fib, V tac, need for AICD





Subjective: Pt feels ok, felt symptoms with non-sustained run of V tac this am, 

says she gets this intermittently. Didn't realize it was dangerous.  Currently 

denies CP or SOB.  No fevers/chills.  Says diarrhea presents x6 months, thinks 

it is related to meds.


Objective: 


 Vital Signs











Temp Pulse Resp BP Pulse Ox


 


 98.1 C H  68   16   128/66 H  100 


 


 11/17/18 08:10  11/17/18 08:10  11/17/18 08:10  11/17/18 08:10  11/17/18 08:10








 Microbiology











 11/16/18 18:17 Gastrointestinal Tract Panel (PCR) - Final





 Stool    No Organism Detected By Pcr








 Laboratory Results





 11/17/18 03:20 





 11/17/18 03:20 





 











 11/16/18 11/17/18 11/18/18





 05:59 05:59 05:59


 


Intake Total  600 360


 


Output Total  900 


 


Balance  -300 360








 











PT  13.1 SEC (12.0-15.0)   11/16/18  09:40    


 


INR  0.97  (0.83-1.16)   11/16/18  09:40    














- Physical Exam


Constitutional: no apparent distress


Eyes: PERRL


Ears, Nose, Mouth, Throat: moist mucous membranes


Cardiovascular: regular rate and rhythym


Respiratory: no respiratory distress, clear to auscultation


Gastrointestinal: normoactive bowel sounds, soft, non-tender abdomen


Skin: warm


Musculoskeletal: full muscle strength


Neurologic: AAOx3


Psychiatric: interacting appropriately





ICD10 Worksheet


Patient Problems: 


 Problems











Problem Status Onset


 


Atrial fibrillation Acute

## 2018-11-18 RX ADMIN — CARVEDILOL SCH MG: 3.12 TABLET, FILM COATED ORAL at 17:51

## 2018-11-18 RX ADMIN — ENOXAPARIN SODIUM SCH: 100 INJECTION SUBCUTANEOUS at 10:54

## 2018-11-18 RX ADMIN — LEVOTHYROXINE SODIUM SCH MCG: 0.09 TABLET ORAL at 06:12

## 2018-11-18 RX ADMIN — CARVEDILOL SCH MG: 3.12 TABLET, FILM COATED ORAL at 08:55

## 2018-11-18 RX ADMIN — ATORVASTATIN CALCIUM SCH: 20 TABLET, FILM COATED ORAL at 21:46

## 2018-11-18 RX ADMIN — PANTOPRAZOLE SODIUM SCH MG: 40 TABLET, DELAYED RELEASE ORAL at 08:56

## 2018-11-18 NOTE — ASMTCMCOM
CM Note

 

CM Note                       

Notes:

Patient to have AICD implanted tomorrow. Family here today at bedside. I don't anticipate any d.c 

needs, but CM will follow post-operatively. 

 

Date Signed:  11/18/2018 09:56 AM

Electronically Signed By:Aicha Ferrer RN

## 2018-11-18 NOTE — HOSPPROG
Hospitalist Progress Note


Assessment/Plan: 





74yo F with history of CAD/inferior MI s/p multivessel PCI complicated by 

severe ischemic cardiomyopathy (LVEF 18% on recent echo), paroxysmal atrial 

fibrillation not on anticoagulation, peripheral vascular disease s/p several 

revascularizations presents with subacute diarrhea and epigastric discomfort 

found to be in atrial fibrillation with RVR.





# Atrial fibrillation with RVR:  Off Dilt, now in NSR.  Lvyec0yenu score of 5. 


   - Cont Lovenox, discussed changing to Coumadin after AICD placement, pt 

agrees (could drop one of her anti-platelets to reduce bleeding risk)


   - Added Coreg yest for rate control





# CAD / ICM: recent echo showed EF 18%, remote multivessel PCI.


   - cont BB, anti-platelet held for planned procedure


   - AICD Monday (hold lovenox Sun pm, NPO after midnight)





# NSVT: 15 beats this am, symptomatic


   - discussed with cards, started coreg


   - pt now agreeable to AICD, planned for Monday morning, hold anti-platelets





# Acute kidney injury: Likely prerenal, Cr 1.4 --> 1.1


   - check ua





# Diarrhea: Sounds chronic. No h/o C diff. No recent abx or travel. Last c-

scope 5 yrs ago, she notes "pre-cancerous" findings


   - GI PCR neg


   - send fecal fat, fecal leuks, celiac labs


   - likely needs repeat C-scope, but prioritize need for AICD and 

stabilization of CV status





# Dyspepsia: cont PPI





# Peripheral vascular disease: Holding ASA, Brilinta for procedure Monday


   - consider resuming just single anti-platelet therapy with addition of 

anticoagulation





# Hypothyroidism - TSH slightly elevated, though pt recently decreased 

levothyroxine dose due to concern this was causing diarrhea 


   - outpt f/u to determine if her dose should be increased, defer doing so now 

with rapid A fib and NSVT





VTE ppx: systemic anticoagulation


Code: full


Dispo: Cont inpt for ongoing management of a fib, V tac, need for AICD





Subjective: Pt feels better today, appetite improved.  RN does not describe 

much diarrhea.  No CP or SOB.  Pt c/o dark urine, no dysuria.  No fevers/

chills.  She refused lovenox this am.


Objective: 


 Vital Signs











Temp Pulse Resp BP Pulse Ox


 


 36.8 C   68   17   128/73 H  96 


 


 11/18/18 07:42  11/18/18 07:42  11/18/18 07:42  11/18/18 07:42  11/18/18 07:42








 Laboratory Results





 11/17/18 03:20 





 11/18/18 03:20 





 











 11/17/18 11/18/18 11/19/18





 05:59 05:59 05:59


 


Intake Total 600 1440 


 


Output Total 900 1100 


 


Balance -300 340 








 











PT  13.1 SEC (12.0-15.0)   11/16/18  09:40    


 


INR  0.97  (0.83-1.16)   11/16/18  09:40    














- Physical Exam


Constitutional: no apparent distress


Eyes: PERRL


Ears, Nose, Mouth, Throat: moist mucous membranes


Cardiovascular: regular rate and rhythym


Respiratory: no respiratory distress, clear to auscultation


Gastrointestinal: normoactive bowel sounds, soft, non-tender abdomen


Skin: warm


Musculoskeletal: full muscle strength


Neurologic: AAOx3


Psychiatric: interacting appropriately





ICD10 Worksheet


Patient Problems: 


 Problems











Problem Status Onset


 


Atrial fibrillation Acute

## 2018-11-19 PROCEDURE — 02H63KZ INSERTION OF DEFIBRILLATOR LEAD INTO RIGHT ATRIUM, PERCUTANEOUS APPROACH: ICD-10-PCS | Performed by: INTERNAL MEDICINE

## 2018-11-19 PROCEDURE — 0JH608Z INSERTION OF DEFIBRILLATOR GENERATOR INTO CHEST SUBCUTANEOUS TISSUE AND FASCIA, OPEN APPROACH: ICD-10-PCS | Performed by: INTERNAL MEDICINE

## 2018-11-19 PROCEDURE — 02HK3KZ INSERTION OF DEFIBRILLATOR LEAD INTO RIGHT VENTRICLE, PERCUTANEOUS APPROACH: ICD-10-PCS | Performed by: INTERNAL MEDICINE

## 2018-11-19 RX ADMIN — PANTOPRAZOLE SODIUM SCH MG: 40 TABLET, DELAYED RELEASE ORAL at 09:50

## 2018-11-19 RX ADMIN — CARVEDILOL SCH MG: 3.12 TABLET, FILM COATED ORAL at 18:31

## 2018-11-19 RX ADMIN — ATORVASTATIN CALCIUM SCH MG: 20 TABLET, FILM COATED ORAL at 20:03

## 2018-11-19 RX ADMIN — LEVOTHYROXINE SODIUM SCH MCG: 0.09 TABLET ORAL at 06:16

## 2018-11-19 RX ADMIN — CARVEDILOL SCH MG: 3.12 TABLET, FILM COATED ORAL at 09:50

## 2018-11-19 NOTE — SOAPPROG
SOAP Progress Note


Assessment/Plan: 


Assessment:


1.  Ischemic cardiomyopathy.  Her ejection fraction is 18%.  Fortunately, she 

is New York heart Association functional class 1 at baseline.  She has not 

manifested any malignant arrhythmias.  There is no history of syncope.


2. Coronary artery disease.  Distant history of inferior infarct status post 

emergent revascularization by Dr. Hill up in [place].  She has been free of 

angina since then.  She is followed as an outpatient by Dr. Gann.


3. Peripheral vascular disease.  She has a history of Lariche syndrome.  She is 

without claudication.


4. Hyperlipidemia.


5. Paroxysmal atrial fibrillation.  This is her 1st presentation with atrial 

fibrillation.  She is clearly a candidate for systemic anticoagulation in light 

of her chads Vasc score of 4. 








Plan:


1. We will proceed with implantation of a dual-chamber ICD today.


2. Following ICD implantation will plan to institute systemic anticoagulation 

and continue with aggressive secondary prevention measures as well as 

aggressive guideline directed medical therapy.


3. We will follow along with you.


11/19/18 10:09





Subjective: 


The patient was seen and examined.  Her chart was reviewed.  She has an 

ischemic cardiomyopathy following an inferior infarct about 5 years ago.  Her 

ejection fraction is below 20%.  Fortunately, she has been hemodynamically 

stable and New York heart Association functional class 1. In the past she has 

declined ICD therapy.  She is admitted now with new onset atrial fibrillation 

associated with palpitations.  Fortunately, she has reverted back to sinus 

rhythm is doing well currently.  Over the weekend Dr. Goins talked to her 

about the merits of having an ICD.  She states that she is at a point where she 

would be willing to have this procedure performed.  She has not had any angina.

  She notes no edema.  She denies orthopnea, PND and edema.


Objective: 





 Vital Signs











Temp Pulse Resp BP Pulse Ox


 


 36.7 C   75   18   154/86 H  98 


 


 11/19/18 08:00  11/19/18 08:00  11/19/18 08:00  11/19/18 08:00  11/19/18 08:00








 Microbiology











 11/18/18 10:37 Fecal Leukocyte Stain - Final





 Stool 








 Laboratory Results





 11/17/18 03:20 





 11/18/18 03:20 





 











 11/18/18 11/19/18 11/20/18





 05:59 05:59 05:59


 


Intake Total 1440 835 


 


Output Total 1100 250 


 


Balance 340 585 








 











PT  13.1 SEC (12.0-15.0)   11/16/18  09:40    


 


INR  0.97  (0.83-1.16)   11/16/18  09:40    














Physical Exam





- Physical Exam


General Appearance: other (Chronically ill)


Neck: non-tender, full range of motion


Respiratory: chest non-tender, lungs clear, normal breath sounds


Cardiac/Chest: regular rate, rhythm, other (She has nonpalpable pedal pulses)


Peripheral Pulses: 2+: carotid (R), carotid (L)


Abdomen: normal bowel sounds, non-tender, soft


Pelvic Exam: deferred


Rectal: deferred


Skin: normal color


Extremities: non-tender


Neuro/Psych: alert, oriented x 3





ICD10 Worksheet


Patient Problems: 


 Problems











Problem Status Onset


 


Atrial fibrillation Acute

## 2018-11-19 NOTE — CPEKG
Test Reason : OPEN

Blood Pressure : ***/*** mmHG

Vent. Rate : 069 BPM     Atrial Rate : 068 BPM

   P-R Int : 160 ms          QRS Dur : 105 ms

    QT Int : 452 ms       P-R-T Axes : 058 -14 -87 degrees

   QTc Int : 485 ms

 

Sinus rhythm

Ventricular premature complex

Inferior infarct, age indeterminate

Lateral leads are also involved

 

Confirmed by Kim Mueller ([id number]) on 11/19/2018 5:19:19 PM

 

Referred By:             Confirmed By:Kim Mueller

## 2018-11-19 NOTE — CPIP
DATE OF PROCEDURE:  11/19/2018



INDICATIONS:  The patient is a 73-year-old female with an ischemic cardiomyopathy, ejection fraction 
of 18% on maximal medical therapy, referred for ICD implantation with an indication of primary preven
tion.



PROCEDURE:  Dual-chamber implantable cardioverter defibrillator implantation.



TECHNIQUE:  Following informed consent and in the fasting state, the patient was brought to the [facility] catheterization laboratory.  Immediately prior to the procedure, prophylactic Ancef was administer
ed.  Left chest was prepped and draped in the usual sterile fashion.  2% lidocaine was infiltrated in
 the skin below the left clavicle.  A venogram was performed identifying a widely patent axillary sub
clavian system.  



Using the modified Seldinger technique, access was gained to the axillary vein at the level of the fi
rst rib.  A single 0.035 J-wire was then passed into the superior vena cava under fluoroscopy.  The p
rocedure that was then repeated, adding a second J-wire.  Using the first of these wires, a 7-[place] 
venous sheath was placed.  Under fluoroscopy, this allowed us to position the right ventricular lead 
in the right ventricular apex.  The lead was screwed into place, tested, and the sheath torn away.  T
he redundant portion of this lead was then secured to the ICD pocket floor.  Using the remaining J-wi
re, an 8-[place] sheath was placed.  The atrial lead was brought to the field and placed in the right 
atrial appendage and screwed into position.  The sheath was torn away and the lead secured to the pac
emaker pocket floor using 0 Ethibond.  The lead was tested with excellent capture and sensing.  The p
ocket was then inspected.  The pocket was irrigated with antibiotic-containing solution.  Leads were 
then affixed to the header according to  guidelines.  The device was then placed in the p
ocket and secured into place with a single 0 Ethibond suture.  At this point, the pocket was then juan pablo
sed initially with 2 layers of interrupted suture using 2-0 and 3-0 Vicryl and finally running Strata
fix for the skin.  Steri-Strips and a dry dressing were applied.



COMPLICATIONS:  None



DEVICE INFORMATION:  The device is a [employer] Fortify Assura DR, reference #[id number], seria
l #[id number].  The atrial lead is a [employer] Tendril MRI compatible device, reference #[id number]
, serial #[id number], and the ventricular lead is a [employer] Durata lead, reference #[id number],
 serial #[id number].  In the atrium, capture is 1.6 V at 0.5 msec with sensed P waves of 3.1 mV and le
ad impedance 588 ohms.  In the right ventricle, capture is 0.3 V, 0.5 msec.  Sensed R-waves is 18.6 m
V and lead impedance of 720 ohms.



DISPOSITION:  The patient be recovered in the CVC and returned to her room for continued care.





Job #:  [id number]

## 2018-11-19 NOTE — HOSPPROG
Hospitalist Progress Note


Assessment/Plan: 





72yo F with history of CAD/inferior MI s/p multivessel PCI complicated by 

severe ischemic cardiomyopathy (LVEF 18% on recent echo), paroxysmal atrial 

fibrillation not on anticoagulation, peripheral vascular disease s/p several 

revascularizations presents with subacute diarrhea and epigastric discomfort 

found to be in atrial fibrillation with RVR.





# Atrial fibrillation with RVR: Now in NSR after getting hydrated. [id number] 

score of 5. 


   - continue coreg


   - hold LMWH until tomorrow with today's procedure, start warfarin this 

evening





# CAD / ICM: recent echo showed EF 18%, remote multivessel PCI, she is 

compensated


   - cont BB, hold anti-platelets until incision site checked


   - AICD placed today





# NSVT: runs up to 15 beats, asymptomatic


   - on BB, now has ICD





# Acute kidney injury: Likely prerenal, Cr 1.4 --> 1.1 and stable.


   - monitor in AM





# Diarrhea: Now resolved. GI PCR, stool studies and celiac panel negative. 


   - hold on inpt c-scope unless symptoms recur, may need as outpt





# Dyspepsia: Resolved


   - cont PPI





# Peripheral vascular disease: Holding DAPT for procedure today


   - resume single antiplatelet agent (likely brilinta) tomorrow





# Hypothyroidism - TSH slightly elevated, though pt recently decreased 

levothyroxine dose due to concern this was causing diarrhea 


   - outpt f/u to determine if her dose should be increased, defer doing so now 

with rapid A fib and NSVT





VTE ppx: starting warfarin, SCDs


Code: full


Dispo: Cont inpt for post-procedure monitoring and initiation of 

anticoagulation. Possibly discharge in coming day or so.


Subjective: Doing ok this morning. Denies any diarrhea or GI upset in few days. 

No fevers. No chest pain or dyspnea. She is getting an ICD this afternoon.


Objective: 


 Vital Signs











Temp Pulse Resp BP Pulse Ox


 


 36.7 C   75   18   154/86 H  98 


 


 11/19/18 08:00  11/19/18 12:55  11/19/18 12:55  11/19/18 12:55  11/19/18 12:55








 Microbiology











 11/18/18 10:37 Fecal Leukocyte Stain - Final





 Stool 








 Laboratory Results





 11/17/18 03:20 





 11/18/18 03:20 





 











 11/18/18 11/19/18 11/20/18





 05:59 05:59 05:59


 


Intake Total 1440 835 


 


Output Total 1100 250 300


 


Balance 340 585 -300








 











PT  13.1 SEC (12.0-15.0)   11/16/18  09:40    


 


INR  0.97  (0.83-1.16)   11/16/18  09:40    














- Physical Exam


Constitutional: no apparent distress, appears nourished, not in pain


Eyes: PERRL, anicteric sclera, EOMI


Ears, Nose, Mouth, Throat: moist mucous membranes, hearing normal, ears appear 

normal, no oral mucosal ulcers


Cardiovascular: regular rate and rhythym, no murmur, rub, or gallop, No JVD, No 

edema


Respiratory: no respiratory distress, no rales or rhonchi, clear to auscultation


Gastrointestinal: normoactive bowel sounds, soft, non-tender abdomen, no 

palpable masses


Genitourinary: no bladder fullness, no bladder tenderness, no renal bruits


Skin: no rashes or abrasions, no fluctuance, no induration


Musculoskeletal: full muscle strength, no muscle tenderness, normal joint ROM


Neurologic: AAOx3, sensation intact bilaterally


Psychiatric: interacting appropriately, not anxious, not encephalopathic, 

thought process linear





ICD10 Worksheet


Patient Problems: 


 Problems











Problem Status Onset


 


Atrial fibrillation Acute

## 2018-11-20 LAB
INR PPP: 1.01 (ref 0.83–1.16)
PLATELET # BLD: 220 10^3/UL (ref 150–400)
PROTHROMBIN TIME: 13.5 SEC (ref 12–15)

## 2018-11-20 RX ADMIN — ATORVASTATIN CALCIUM SCH MG: 20 TABLET, FILM COATED ORAL at 21:00

## 2018-11-20 RX ADMIN — LEVOTHYROXINE SODIUM SCH MCG: 0.09 TABLET ORAL at 05:45

## 2018-11-20 RX ADMIN — PANTOPRAZOLE SODIUM SCH MG: 40 TABLET, DELAYED RELEASE ORAL at 08:07

## 2018-11-20 RX ADMIN — CARVEDILOL SCH MG: 3.12 TABLET, FILM COATED ORAL at 16:56

## 2018-11-20 RX ADMIN — CARVEDILOL SCH MG: 3.12 TABLET, FILM COATED ORAL at 08:07

## 2018-11-20 NOTE — HOSPPROG
Hospitalist Progress Note


Assessment/Plan: 





74yo F with history of CAD/inferior MI s/p multivessel PCI complicated by 

severe ischemic cardiomyopathy (LVEF 18% on recent echo), paroxysmal atrial 

fibrillation not on anticoagulation, peripheral vascular disease s/p several 

revascularizations presents with subacute diarrhea and epigastric discomfort 

found to be in atrial fibrillation with RVR.





# Left apical pneumothorax: Related to recent procedure


   - continue high flow oxygen


   - repeat CXR this afternoon





# Atrial fibrillation with RVR: Now in NSR after getting hydrated. [id number] 

score of 5. 


   - continue coreg


   - holding anticoagulation until have plan with ptx (may need chest tube); 

plan to be on warfarin for long term anticoagulation (patient agreeable to this)





# CAD / ICM: recent echo showed EF 18%, remote multivessel PCI, she is 

compensated


   - cont BB, holding anti-platelets


   - AICD placed





# NSVT: runs up to 15 beats, asymptomatic


   - on BB, now has AICD





# Acute kidney injury: Resolved. Prerenal/dehydration.


   - monitor 





# Diarrhea: Now resolved. GI PCR, stool studies and celiac panel negative. 


   - hold on inpt c-scope unless symptoms recur, may need as outpt





# Dyspepsia: Resolved


   - cont PPI





# Peripheral vascular disease: Holding DAPT 


   - resume single antiplatelet agent (likely brilinta) once wound cleared by 

cardiology





# Hypothyroidism - TSH slightly elevated, though pt recently decreased 

levothyroxine dose due to concern this was causing diarrhea 


   - outpt f/u to determine if her dose should be increased, defer doing so now 

with rapid A fib and NSVT





VTE ppx: SCDs


Code: full


Dispo: Cont inpt for management of post-procedure pneumothorax. Plan to 

initiate warfarin with lovenox bridge prior to discharge and also resume 

brilinta once pneumothorax stabilized and ICD pocket looks good.


Subjective: Tolerated procedure well yesterday. Feeling a little fatigued but 

denies dyspnea, chest pain. No diarrhea or GI symptoms, eating well.  Post ICD 

cxr shows small apical left pneumothorax. Dr Ye has started her on high flow 

oxygen.


Objective: 


 Vital Signs











Temp Pulse Resp BP Pulse Ox


 


 36.8 C   79   14   158/95 H  96 


 


 11/20/18 15:28  11/20/18 15:28  11/20/18 15:28  11/20/18 15:28  11/20/18 15:28








 Laboratory Results





 11/20/18 03:31 





 11/20/18 03:31 





 











 11/19/18 11/20/18 11/21/18





 05:59 05:59 05:59


 


Intake Total 835 520 490


 


Output Total 250 400 200


 


Balance 585 120 290








 











PT  13.5 SEC (12.0-15.0)   11/20/18  03:31    


 


INR  1.01  (0.83-1.16)   11/20/18  03:31    














- Physical Exam


Constitutional: no apparent distress, appears nourished, not in pain


Eyes: PERRL, anicteric sclera, EOMI


Ears, Nose, Mouth, Throat: moist mucous membranes, hearing normal, ears appear 

normal, no oral mucosal ulcers


Cardiovascular: regular rate and rhythym, no murmur, rub, or gallop, No edema


Respiratory: no respiratory distress, no rales or rhonchi, clear to auscultation


Gastrointestinal: normoactive bowel sounds, soft, non-tender abdomen, no 

palpable masses


Genitourinary: no bladder fullness, no bladder tenderness, no renal bruits


Skin: other (left chest wall with ICD, pocket with small hematoma)


Musculoskeletal: full muscle strength, no muscle tenderness, normal joint ROM


Neurologic: AAOx3, sensation intact bilaterally


Psychiatric: interacting appropriately, not anxious, not encephalopathic, 

thought process linear





ICD10 Worksheet


Patient Problems: 


 Problems











Problem Status Onset


 


Atrial fibrillation Acute

## 2018-11-20 NOTE — ASMTCMCOM
CM Note

 

CM Note                       

Notes:

Pt continues to require treatment.  No identified CM needs; CM will follow for changes.



D/C Plan:  Independent

 

Date Signed:  11/20/2018 03:04 PM

Electronically Signed By:Sara Hernandez

## 2018-11-20 NOTE — SOAPPROG
SOAP Progress Note


Assessment/Plan: 


Assessment:


1.  Ischemic cardiomyopathy.  Her ejection fraction is 18%.  Fortunately, she 

is [place] heart Association functional class 1 at baseline.  She has not 

manifested any malignant arrhythmias.  There is no history of syncope.


2. Coronary artery disease.  Distant history of inferior infarct status post 

emergent revascularization by Dr. Hill up in [place].  She has been free of 

angina since then.  She is followed as an outpatient by Dr. Gann.


3. Peripheral vascular disease.  She has a history of Lariche syndrome.  She is 

without claudication.


4. Hyperlipidemia.


5. Paroxysmal atrial fibrillation.  This is her 1st presentation with atrial 

fibrillation.  She is clearly a candidate for systemic anticoagulation in light 

of her chads Vasc score of 4. 





11/20/2018.  She underwent dual-chamber ICD implantation yesterday.  

Unfortunately, this appears to have been complicated by a small apical 

pneumothorax.  This is asymptomatic and she is oxygenating well without 

hemodynamic compromise.  I think that we will be able to manage this 

conservatively with oxygen therapy and careful radiographic follow-up of the 

pneumothorax.








Plan:


1.  I have started her on oxygen by mask at 10 L.


2. Will plan for a follow-up chest x-ray this afternoon to reassess her 

pneumothorax.


3.  Pending resolution of her pneumothorax her anticoagulation will be held.


4. As an outpatient, I think we need to consider more aggressive guideline 

directed medical therapy.


5. We will follow along with you.  


11/20/18 11:35





Subjective: 





She states that she feels excellent today.  She has no chest discomfort either 

at the site of her device implantation or otherwise.  Her device interrogation 

indicated excellent thresholds.  Unfortunately, it looks like she has a very 

small left apical pneumothorax.


Objective: 





 Vital Signs











Temp Pulse Resp BP Pulse Ox


 


 36.7 C   77   16   146/86 H  95 


 


 11/20/18 07:30  11/20/18 07:30  11/20/18 07:30  11/20/18 07:30  11/20/18 07:30








 Microbiology











 11/18/18 10:37 Fecal Leukocyte Stain - Final





 Stool 








 Laboratory Results





 11/20/18 03:31 





 11/20/18 03:31 





 











 11/19/18 11/20/18 11/21/18





 05:59 05:59 05:59


 


Intake Total 835 520 


 


Output Total 250 400 


 


Balance 585 120 








 











PT  13.5 SEC (12.0-15.0)   11/20/18  03:31    


 


INR  1.01  (0.83-1.16)   11/20/18  03:31    














Physical Exam





- Physical Exam


General Appearance: WD/WN, no apparent distress


Neck: non-tender, full range of motion


Respiratory: chest non-tender, lungs clear, other (ICD in place in the left 

infraclavicular fossa without overt complications)


Cardiac/Chest: regular rate, rhythm, No edema, No gallop, No JVD


Peripheral Pulses: 2+: carotid (R), carotid (L)


Abdomen: non-tender, soft


Pelvic Exam: deferred


Rectal: deferred





ICD10 Worksheet


Patient Problems: 


 Problems











Problem Status Onset


 


Atrial fibrillation Acute

## 2018-11-21 VITALS — DIASTOLIC BLOOD PRESSURE: 66 MMHG | SYSTOLIC BLOOD PRESSURE: 121 MMHG

## 2018-11-21 LAB
INR PPP: 1.14 (ref 0.83–1.16)
PROTHROMBIN TIME: 14.8 SEC (ref 12–15)

## 2018-11-21 RX ADMIN — LEVOTHYROXINE SODIUM SCH MCG: 0.09 TABLET ORAL at 08:01

## 2018-11-21 RX ADMIN — CARVEDILOL SCH MG: 3.12 TABLET, FILM COATED ORAL at 08:01

## 2018-11-21 RX ADMIN — PANTOPRAZOLE SODIUM SCH MG: 40 TABLET, DELAYED RELEASE ORAL at 08:01

## 2018-11-21 NOTE — PDDCSUM
Discharge Summary


Discharge Summary: 


HPI/Hospital Course





74yo F with history of CAD/inferior MI s/p multivessel PCI complicated by 

severe ischemic cardiomyopathy (LVEF 18% on recent echo), paroxysmal atrial 

fibrillation not on anticoagulation, peripheral vascular disease s/p several 

revascularizations admitted with  subacute diarrhea and epigastric discomfort 

found to be in atrial fibrillation with RVR.





She had a AICD placed. She developed a small Left apical pneumothorax and this 

was managed conservatively. Todays' CXR shows improvement but not resolution. 

She is to repeat a CXR on Friday (2 days from today)


she has been started on Warfarin. She will have her INR checked on Friday again 

with Cards to manage


This was d/w Cardiology





She is to f/u with Cardiology on Friday





DDX:


# Left apical pneumothorax: Related to recent procedure





# Atrial fibrillation with RVR: Now in NSR after getting hydrated. [id number] 

score of 5. 


   - continue coreg


   -AC per Cards. Warfarin started





# CAD / ICM: recent echo showed EF 18%, remote multivessel PCI, she is 

compensated


   - cont BB, Aspirin


   - AICD placed





# NSVT: runs up to 15 beats, asymptomatic


   - on BB, now has AICD





# Acute kidney injury: Resolved. Prerenal/dehydration.


   - monitor 





# Diarrhea: Now resolved. GI PCR, stool studies and celiac panel negative. 


   - hold on inpt c-scope unless symptoms recur, may need as outpt





# Dyspepsia: Resolved








# Peripheral vascular disease: Holding DAPT 








# Hypothyroidism - TSH slightly elevated, though pt recently decreased 

levothyroxine dose due to concern this was causing diarrhea 


   - outpt f/u to determine if her dose should be increased, defer doing so now 

with rapid A fib and NSVT





Exam: 


NAD


AAOX3


RRR


DECREASED LUNG SOUNDS, NORMAL WORK OF BREATHING


S/NT/ND





MEDS: SEE MED REC





F/U: PER ABOVE





TOTAL TIME SPENT ON D/C IS 35 MINS


D/W NURSING AND CARDIOLOGY

## 2018-11-29 ENCOUNTER — HOSPITAL ENCOUNTER (OUTPATIENT)
Dept: HOSPITAL 80 - FIMAGING | Age: 73
End: 2018-11-29
Attending: INTERNAL MEDICINE
Payer: COMMERCIAL

## 2018-11-29 DIAGNOSIS — Z95.0: ICD-10-CM

## 2018-11-29 DIAGNOSIS — J93.9: Primary | ICD-10-CM

## 2018-11-29 DIAGNOSIS — Z98.82: ICD-10-CM

## 2019-03-17 ENCOUNTER — HOSPITAL ENCOUNTER (OUTPATIENT)
Dept: HOSPITAL 80 - FED | Age: 74
Setting detail: OBSERVATION
LOS: 1 days | Discharge: HOME | End: 2019-03-18
Attending: INTERNAL MEDICINE | Admitting: INTERNAL MEDICINE
Payer: COMMERCIAL

## 2019-03-17 DIAGNOSIS — I25.2: ICD-10-CM

## 2019-03-17 DIAGNOSIS — I47.1: Primary | ICD-10-CM

## 2019-03-17 DIAGNOSIS — I25.5: ICD-10-CM

## 2019-03-17 DIAGNOSIS — I10: ICD-10-CM

## 2019-03-17 DIAGNOSIS — I48.91: ICD-10-CM

## 2019-03-17 DIAGNOSIS — I25.10: ICD-10-CM

## 2019-03-17 DIAGNOSIS — Z95.5: ICD-10-CM

## 2019-03-17 DIAGNOSIS — E78.5: ICD-10-CM

## 2019-03-17 LAB
INR PPP: 1.42 (ref 0.83–1.16)
PLATELET # BLD: 269 10^3/UL (ref 150–400)
PROTHROMBIN TIME: 16.7 SEC (ref 12–15)

## 2019-03-17 PROCEDURE — G0378 HOSPITAL OBSERVATION PER HR: HCPCS

## 2019-03-17 PROCEDURE — 93005 ELECTROCARDIOGRAM TRACING: CPT

## 2019-03-17 RX ADMIN — CARVEDILOL SCH MG: 3.12 TABLET, FILM COATED ORAL at 18:23

## 2019-03-17 NOTE — CPEKG
Test Reason : OPEN

Blood Pressure : ***/*** mmHG

Vent. Rate : 074 BPM     Atrial Rate : 074 BPM

   P-R Int : 155 ms          QRS Dur : 105 ms

    QT Int : 397 ms       P-R-T Axes : 051 -13 249 degrees

   QTc Int : 441 ms

 

Sinus rhythm

Inferior infarct, age indeterminate

Abnrm T, consider ischemia, anterolateral lds

 

Confirmed by Martin Awan ([id number]) on 3/17/2019 2:23:33 PM

 

Referred By: PHYSICIAN ED           Confirmed By:Martin Awan

## 2019-03-17 NOTE — EDPHY
H & P


Time Seen by Provider: 03/17/19 14:07


HPI/ROS: 





Chief complaint.  Defibrillator fired





HPI.  73-year-old female presents emergency department after her pacer/

defibrillator shocked her 2 times today.  The pacer defibrillator replaced 4 

months ago.  She has never been shock before.  Today she was at home and she 

felt like her heart was not beating correctly.  She became weak dizzy and 

lightheaded and feel like she could pass out.  She laid down and then she had a 

shock and then a 2nd shock 5 sec later.  She had no chest pain or shortness of 

breath.  She is unsure of the arrhythmia that caused them to put the 

defibrillator in.  Is the 1st time she has been shocked.  She spoke to 

Cardiology today who sent her in for evaluation.





ROS


10 systems were reviewed and negative with the exception of the elements 

mentioned in the history of present illness





Past Medical/Surgical History: 





MI, pacer defibrillator, CHF, AFib


Social History: 





Single, nonsmoker, no alcohol


Smoking Status: Never smoked


Physical Exam: 





General Appearance:  Alert pleasant well-developed female mild distress vital 

signs are stable


Eyes: Pupils equal and round no pallor or injection.


ENT, Mouth:  Mucous membranes are moist.


Respiratory:  There are no retractions, lungs are clear to auscultation.


Cardiovascular: Regular rate and rhythm.


Gastrointestinal:   Abdomen is soft and nontender, no masses, bowel sounds 

normal.


Neurological:  Awake and alert, sensory and motor exams grossly normal.


Skin: Warm and dry, no rashes.


Musculoskeletal:  Neck is supple nontender.


Extremities  symmetrical, full range of motion.


Psychiatric: Patient is oriented X 3, there is no agitation.


Constitutional: 


 Initial Vital Signs











Temperature (C)  36.8 C   03/17/19 13:34


 


Heart Rate  82   03/17/19 13:34


 


Respiratory Rate  18   03/17/19 13:34


 


Blood Pressure  140/91 H  03/17/19 13:34


 


O2 Sat (%)  97   03/17/19 13:34








 











O2 Delivery Mode               Room Air














Allergies/Adverse Reactions: 


 





amlodipine Allergy (Verified 03/17/19 13:37)


 Vomiting


clindamycin Allergy (Verified 03/17/19 13:37)


 Rash


fenofibrate Allergy (Verified 03/17/19 13:37)


 Vomiting


formaldehyde Allergy (Verified 03/17/19 13:37)


 Vomiting


iv contrast Allergy (Uncoded 05/25/18 10:29)


 








Home Medications: 














 Medication  Instructions  Recorded


 


Simvastatin [Zocor] 40 mg PO HS 05/25/18


 


Aspirin [Aspirin 81mg (*)] 81 mg PO DAILY 11/16/18


 


Levothyroxine [Synthroid 88 mcg 88 mcg PO DAILY06 11/16/18





(*)]  


 


Carvedilol [Coreg (*)] 3.125 mg PO BIDMEAL #60 tab 11/21/18


 


Warfarin Sodium [Coumadin 5MG (*)] 5 mg PO DAILY16 #30 tab 11/21/18














Medical Decision Making





- Diagnostics


EKG Interpretation: 





EKG interpreted by me shows normal sinus rhythm normal interval old inferior 

MI.  My in your interventricular conduction delay.  Lateral T-wave inversion 

and inferior T-wave inversion.  Rate 74





The ischemic T-wave inversions are not changed from previous EKG in November 2018


Procedures: 





IV normal saline, monitor


ED Course/Re-evaluation: 





I consulted discussed case with Dr. Goins for cardiology who recommends 

interrogation of the pacer defibrillator.  If appropriate shock for V-tach then 

he feels the patient can go home with the workup is otherwise normal





[employer] interrogated the pacemaker and determined that the rhythm was atrial 

tach/flutter with a 1-1 conduction and rate is size 213. Shock was because of 

the rapid conduction.  There was no V-tach or VFib





I consulted again with Dr. Goins who recommends admission.  They will assess 

her tomorrow for possible ablation





I discussed this treatment plan with the patient.  She expresses understanding 

and agreement


Differential Diagnosis: 





Defibrillator fired.  I considered V-tach, ventricular fibrillation.  It 

appears the patient had atrial tachycardia with a 1-1 conduction and rate of 213





- Data Points


Laboratory Results: 


 Laboratory Results





 03/17/19 13:45 





 03/17/19 13:45 





 











  03/17/19 03/17/19 03/17/19





  13:48 13:45 13:45


 


WBC      





    


 


RBC      





    


 


Hgb      





    


 


Hct      





    


 


MCV      





    


 


MCH      





    


 


MCHC      





    


 


RDW      





    


 


Plt Count      





    


 


MPV      





    


 


Neut % (Auto)      





    


 


Lymph % (Auto)      





    


 


Mono % (Auto)      





    


 


Eos % (Auto)      





    


 


Baso % (Auto)      





    


 


Nucleat RBC Rel Count      





    


 


Absolute Neuts (auto)      





    


 


Absolute Lymphs (auto)      





    


 


Absolute Monos (auto)      





    


 


Absolute Eos (auto)      





    


 


Absolute Basos (auto)      





    


 


Absolute Nucleated RBC      





    


 


Immature Gran %      





    


 


Immature Gran #      





    


 


PT    Pending   





    


 


INR    Pending   





    


 


APTT    Pending   





    


 


Sodium      140 mEq/L mEq/L





     (135-145) 


 


Potassium      3.9 mEq/L mEq/L





     (3.5-5.2) 


 


Chloride      109 mEq/L mEq/L





     () 


 


Carbon Dioxide      23 mEq/l mEq/l





     (22-31) 


 


Anion Gap      8 mEq/L mEq/L





     (6-14) 


 


BUN      26 mg/dL H mg/dL





     (7-23) 


 


Creatinine      1.1 mg/dL H mg/dL





     (0.6-1.0) 


 


Estimated GFR      49 





    


 


Glucose      106 mg/dL H mg/dL





     () 


 


Calcium      10.0 mg/dL mg/dL





     (8.5-10.4) 


 


POC Troponin I  0.02 ng/mL ng/mL    





   (0.00-0.08)   














  03/17/19





  13:45


 


WBC  9.05 10^3/uL 10^3/uL





   (3.80-9.50) 


 


RBC  4.52 10^6/uL 10^6/uL





   (4.18-5.33) 


 


Hgb  14.6 g/dL g/dL





   (12.6-16.3) 


 


Hct  42.8 % %





   (38.0-47.0) 


 


MCV  94.7 fL fL





   (81.5-99.8) 


 


MCH  32.3 pg pg





   (27.9-34.1) 


 


MCHC  34.1 g/dL g/dL





   (32.4-36.7) 


 


RDW  12.9 % %





   (11.5-15.2) 


 


Plt Count  269 10^3/uL 10^3/uL





   (150-400) 


 


MPV  10.8 fL fL





   (8.7-11.7) 


 


Neut % (Auto)  53.9 % %





   (39.3-74.2) 


 


Lymph % (Auto)  35.9 % %





   (15.0-45.0) 


 


Mono % (Auto)  7.3 % %





   (4.5-13.0) 


 


Eos % (Auto)  1.9 % %





   (0.6-7.6) 


 


Baso % (Auto)  0.8 % %





   (0.3-1.7) 


 


Nucleat RBC Rel Count  0.0 % %





   (0.0-0.2) 


 


Absolute Neuts (auto)  4.88 10^3/uL 10^3/uL





   (1.70-6.50) 


 


Absolute Lymphs (auto)  3.25 10^3/uL H 10^3/uL





   (1.00-3.00) 


 


Absolute Monos (auto)  0.66 10^3/uL 10^3/uL





   (0.30-0.80) 


 


Absolute Eos (auto)  0.17 10^3/uL 10^3/uL





   (0.03-0.40) 


 


Absolute Basos (auto)  0.07 10^3/uL 10^3/uL





   (0.02-0.10) 


 


Absolute Nucleated RBC  0.00 10^3/uL 10^3/uL





   (0-0.01) 


 


Immature Gran %  0.2 % %





   (0.0-1.1) 


 


Immature Gran #  0.02 10^3/uL 10^3/uL





   (0.00-0.10) 


 


PT  





  


 


INR  





  


 


APTT  





  


 


Sodium  





  


 


Potassium  





  


 


Chloride  





  


 


Carbon Dioxide  





  


 


Anion Gap  





  


 


BUN  





  


 


Creatinine  





  


 


Estimated GFR  





  


 


Glucose  





  


 


Calcium  





  


 


POC Troponin I  





  











Point of Care Test Results: 


 Chemistry











  03/17/19





  13:48


 


POC Troponin I  0.02 ng/mL ng/mL





   (0.00-0.08) 














Departure





- Departure


Disposition: [facility]


Condition: Fair

## 2019-03-18 VITALS — SYSTOLIC BLOOD PRESSURE: 151 MMHG | DIASTOLIC BLOOD PRESSURE: 81 MMHG

## 2019-03-18 LAB
INR PPP: 1.45 (ref 0.83–1.16)
PROTHROMBIN TIME: 17 SEC (ref 12–15)

## 2019-03-18 RX ADMIN — CARVEDILOL SCH: 3.12 TABLET, FILM COATED ORAL at 12:33

## 2019-03-18 NOTE — GDS
[f 
rep st]



                                                             DISCHARGE SUMMARY





SUPERVISING CARDIOLOGIST:  Francisco Watson MD



ADMISSION DIAGNOSES:  

1.  Defibrillator discharge.

2.  Atrial fibrillation.

3.  Coronary artery disease, status post multi-vessel percutaneous coronary 
intervention.

4.  Hypertension.

5.  Hyperlipidemia.



DISCHARGE DIAGNOSES:  

1.  Atrial tachycardia.

2.  Atrial fibrillation.

3.  Ischemic cardiomyopathy.

4.  Coronary artery disease status post multivessel percutaneous coronary 
intervention.  

5.  Hypertension.

6.  Hyperlipidemia.



HOSPITAL COURSE:  Patient presented 03/17/2019, for further evaluation after 
she received 2 shocks from her ICD.  Device interrogation demonstrated shocks 
were delivered in response to atrial tachycardia with rates up to 218 beats per 
minute.  Patient has done very well overnight without recurrence of atrial 
arrhythmia or other concerning symptoms.  No prior discharge of her 
defibrillator in the past since it was implanted 11/2018.  The patient is 
appropriate and stable for discharge home today.



CURRENT PHYSICAL EXAMINATION:  GENERAL:  Alert and oriented x4, in no apparent 
distress.  VITAL SIGNS:  Blood pressure 144/72, heart rate 69, respiratory rate 
20, SpO2 97% on room air, temp 36.6 degrees Celsius.  RESPIRATORY:  Lungs are 
clear to auscultation without adventitious breath sounds.  CARDIAC:  Normal S1, 
S2.  No S3, S4.  Rhythm is regular.  ABDOMEN:  Normoactive bowel sounds times 
all 4 quadrants.  No masses or tenderness.  Soft to palpation.  SKIN:  Pink, 
warm, dry without cyanosis, clubbing, or peripheral edema.  Pulses are 2+ 
bilaterally.



LABORATORY STUDIES:  Drawn 03/17, demonstrate normal CBC and normal BMP, aside 
from creatinine of 1.1.  INR this morning is 1.45.



PROCEDURES:  Electrocardiogram 03/17 demonstrates normal sinus rhythm with 
prior inferior infarct. 



Device interrogation of her implanted ICD demonstrates atrial tachycardia with 
delivered ATP and 2 delivered shocks yesterday.  No evidence of ventricular 
tachycardia or ventricular fibrillation by device interrogation.



DISCHARGE DISPOSITION:  Patient will be discharged home in stable condition.  
She is under no activity restrictions.



DISCHARGE MEDICATIONS:  Please see discharge medication reconciliation sheet 
for full details.  Please note that the patient's Coreg has been increased to 
12.5 mg daily.



DISCHARGE INSTRUCTIONS:  Patient's Coreg has been increased to 12.5 mg daily 
for rate control of her atrial tachycardia.  She will follow up with Dr. Ye 
within 7 to 10 days.  She is also due for an echocardiogram and this can be 
completed outpatient as previously scheduled.  Patient will continue regular 
followup [facility] and will contact us if she 
experiences any new or concerning symptoms prior to her followup visit with Dr. Ye. 



Time spent on discharge, greater than 30 minutes.





Job #:  [id number]

MTDD

## 2019-03-18 NOTE — ASMTCMCOM
CM Note

 

CM Note                       

Notes:

Pts case discussed in tx rounds. Pt is a 74 y/o female admitted for arrythmia and defibrillator 

fired. Pt will most likely d/c independent when medically stable. No therapies ordered at this 

time. CM available for changes.







Plan: Independent 

 

Date Signed:  03/18/2019 12:16 PM

Electronically Signed By:PAO Ortiz
